# Patient Record
Sex: FEMALE | URBAN - METROPOLITAN AREA
[De-identification: names, ages, dates, MRNs, and addresses within clinical notes are randomized per-mention and may not be internally consistent; named-entity substitution may affect disease eponyms.]

---

## 2023-08-02 ENCOUNTER — TELEPHONE (OUTPATIENT)
Dept: HEMATOLOGY ONCOLOGY | Facility: CLINIC | Age: 63
End: 2023-08-02

## 2023-08-02 NOTE — TELEPHONE ENCOUNTER
Patient called to set up a new patient appointment the patient states she will have a referral sent from her provider and all of her records sent over as well.

## 2024-06-04 ENCOUNTER — DOCUMENTATION (OUTPATIENT)
Dept: OTHER | Facility: OTHER | Age: 64
End: 2024-06-04

## 2024-06-04 NOTE — PROGRESS NOTES
Progress Note:      Mammogram Screening ([facility]):  Pap smear screening ([facility]inic vs [facility] vs [facility]):  PCP ([facility]inic vs [facility] vs [facility]):     Transportation:     Education:Phone call attempt to reach patient. Voice mail left to return my call.